# Patient Record
Sex: FEMALE | Race: WHITE | NOT HISPANIC OR LATINO | URBAN - METROPOLITAN AREA
[De-identification: names, ages, dates, MRNs, and addresses within clinical notes are randomized per-mention and may not be internally consistent; named-entity substitution may affect disease eponyms.]

---

## 2023-11-08 PROBLEM — R68.82 DECREASED SEX DRIVE: Status: ACTIVE | Noted: 2023-11-08

## 2023-11-08 PROBLEM — J45.909 ASTHMA (HHS-HCC): Status: ACTIVE | Noted: 2023-11-08

## 2023-11-08 RX ORDER — ALBUTEROL SULFATE 0.83 MG/ML
SOLUTION RESPIRATORY (INHALATION)
COMMUNITY

## 2023-11-09 ENCOUNTER — TELEMEDICINE (OUTPATIENT)
Dept: BEHAVIORAL HEALTH | Facility: CLINIC | Age: 58
End: 2023-11-09
Payer: COMMERCIAL

## 2023-11-09 DIAGNOSIS — F41.8 ANXIETY ABOUT HEALTH: ICD-10-CM

## 2023-11-09 DIAGNOSIS — F52.0 HYPOACTIVE SEXUAL DESIRE DISORDER: ICD-10-CM

## 2023-11-09 DIAGNOSIS — F43.23 ADJUSTMENT DISORDER WITH MIXED ANXIETY AND DEPRESSED MOOD: Primary | ICD-10-CM

## 2023-11-09 PROCEDURE — 90791 PSYCH DIAGNOSTIC EVALUATION: CPT | Mod: 95 | Performed by: PSYCHOLOGIST

## 2023-11-09 NOTE — LETTER
"2023     Paul Daniel MD  [address] [facility] [address]  [facility] [id number]    Patient: Elvi Dickinson   YOB: 1965   Date of Visit: 2023       Dear Dr. Paul Daniel MD:    Thank you for referring Elvi Dickinson to me for evaluation. Below are my notes for this consultation.  If you have questions, please do not hesitate to call me. I look forward to following your patient along with you.       Sincerely,     Eveline Cope, PhD      CC: No Recipients  ______________________________________________________________________________________    Start Time 11 am  End time 11:58 am  Documentation 10 min  No tobacco use or falls within 6 months    Monica, 58, is referred by her gynecologist for sexual concerns.  Monica is  to Jeromy, 60 x 26 years. Their 3 children , 24, 22, 21 (middle is a girl) all still at home. Jeromy is a  in high stress job. 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  She had ablation in 2013 so not sure when postmenopausal. VMS not bad but having some symptoms of GSM within last year and will discuss vaginal HT with Dr. Daniel.  However her chief complaint of the past few years is loss of desire. She never had high drive but had some and now \"wants to want\".  Situational factors have contributed to her lack of desire.  Jeromy has some health issues--clots and she is worried and he often gets postorgasmic migraines which frighten her because her mohter  of a stroke at 55 and she worries Jeromy will have a stroke thus distracted during sex and rushes.  In addition, double standard about whose orgasm is more important.  To that end, I spent time helping her understand and see this pattern and the necessity of changing it via communication with Jeromy around regaining intimacy vs relying on old patterns. Suggested rekindling desire and better " sex through mindfulness.    In addition, we discussed sexual desire components and her interest in medications to address loss of drive including topical T, addyi and vyleesi. She will first focus on the communication and education (e.g. she avoids him having trouble with erections at all costs vs her own pleasure) and responsive desire along with treating GSM and then we will revisit any pharmacologic options.

## 2023-11-09 NOTE — PROGRESS NOTES
"Start Time 11 am  End time 11:58 am  Documentation 10 min  No tobacco use or falls within 6 months    Monica, 58, is referred by her gynecologist for sexual concerns.  Monica is  to Jeromy, 58 x 26 years. Their 3 children , 24, 22, 21 (middle is a girl) all still at home. Jeromy is a  in high stress job. 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  She had ablation in 2013 so not sure when postmenopausal. VMS not bad but having some symptoms of GSM within last year and will discuss vaginal HT with Dr. Daniel.  However her chief complaint of the past few years is loss of desire. She never had high drive but had some and now \"wants to want\".  Situational factors have contributed to her lack of desire.  Jeromy has some health issues--clots and she is worried and he often gets postorgasmic migraines which frighten her because her mohter  of a stroke at 55 and she worries Jeromy will have a stroke thus distracted during sex and rushes.  In addition, double standard about whose orgasm is more important.  To that end, I spent time helping her understand and see this pattern and the necessity of changing it via communication with Jeromy around regaining intimacy vs relying on old patterns. Suggested rekindling desire and better sex through mindfulness.    In addition, we discussed sexual desire components and her interest in medications to address loss of drive including topical T, addyi and vyleesi. She will first focus on the communication and education (e.g. she avoids him having trouble with erections at all costs vs her own pleasure) and responsive desire along with treating GSM and then we will revisit any pharmacologic options.   "